# Patient Record
Sex: MALE | Race: WHITE | Employment: UNEMPLOYED | URBAN - METROPOLITAN AREA
[De-identification: names, ages, dates, MRNs, and addresses within clinical notes are randomized per-mention and may not be internally consistent; named-entity substitution may affect disease eponyms.]

---

## 2017-05-24 RX ORDER — PERMETHRIN 50 MG/G
CREAM TOPICAL ONCE
COMMUNITY
End: 2018-11-07 | Stop reason: ALTCHOICE

## 2017-05-24 RX ORDER — GABAPENTIN 100 MG/1
100 CAPSULE ORAL 3 TIMES DAILY
COMMUNITY
End: 2017-10-17 | Stop reason: SDUPTHER

## 2017-05-24 RX ORDER — GLIMEPIRIDE 4 MG/1
4 TABLET ORAL
COMMUNITY
End: 2017-08-04 | Stop reason: SDUPTHER

## 2017-05-24 RX ORDER — TAMSULOSIN HYDROCHLORIDE 0.4 MG/1
0.4 CAPSULE ORAL DAILY
COMMUNITY
End: 2018-03-29 | Stop reason: SDUPTHER

## 2017-05-24 RX ORDER — CLOTRIMAZOLE AND BETAMETHASONE DIPROPIONATE 10; .64 MG/G; MG/G
CREAM TOPICAL 2 TIMES DAILY
COMMUNITY
End: 2018-11-07 | Stop reason: ALTCHOICE

## 2017-05-24 RX ORDER — PIOGLITAZONEHYDROCHLORIDE 30 MG/1
30 TABLET ORAL DAILY
COMMUNITY
End: 2017-12-21 | Stop reason: SDUPTHER

## 2017-05-24 RX ORDER — TRIAMCINOLONE ACETONIDE 1 MG/G
CREAM TOPICAL 2 TIMES DAILY
COMMUNITY
End: 2018-11-07 | Stop reason: ALTCHOICE

## 2017-05-24 RX ORDER — LOVASTATIN 20 MG/1
20 TABLET ORAL NIGHTLY
COMMUNITY
End: 2017-09-26 | Stop reason: SDUPTHER

## 2017-05-24 RX ORDER — GEMFIBROZIL 600 MG/1
600 TABLET, FILM COATED ORAL
COMMUNITY
End: 2018-01-17 | Stop reason: SDUPTHER

## 2017-05-24 RX ORDER — LISINOPRIL AND HYDROCHLOROTHIAZIDE 20; 12.5 MG/1; MG/1
1 TABLET ORAL DAILY
COMMUNITY
End: 2017-09-26 | Stop reason: SDUPTHER

## 2017-05-24 RX ORDER — ACETAMINOPHEN AND CODEINE PHOSPHATE 300; 30 MG/1; MG/1
1 TABLET ORAL EVERY 4 HOURS PRN
COMMUNITY
End: 2018-11-07 | Stop reason: ALTCHOICE

## 2017-05-24 RX ORDER — LANCETS
EACH MISCELLANEOUS 4 TIMES DAILY
COMMUNITY

## 2017-05-24 RX ORDER — FAMOTIDINE 20 MG/1
20 TABLET, FILM COATED ORAL 2 TIMES DAILY
COMMUNITY
End: 2017-08-04 | Stop reason: SDUPTHER

## 2017-08-04 ENCOUNTER — OFFICE VISIT (OUTPATIENT)
Dept: ENDOCRINOLOGY | Age: 72
End: 2017-08-04
Payer: MEDICARE

## 2017-08-04 VITALS
DIASTOLIC BLOOD PRESSURE: 62 MMHG | SYSTOLIC BLOOD PRESSURE: 132 MMHG | BODY MASS INDEX: 42.1 KG/M2 | HEIGHT: 69 IN | HEART RATE: 68 BPM | WEIGHT: 284.25 LBS

## 2017-08-04 DIAGNOSIS — M54.50 CHRONIC MIDLINE LOW BACK PAIN WITHOUT SCIATICA: ICD-10-CM

## 2017-08-04 DIAGNOSIS — E11.9 TYPE 2 DIABETES MELLITUS WITHOUT COMPLICATION, WITH LONG-TERM CURRENT USE OF INSULIN (HCC): Primary | ICD-10-CM

## 2017-08-04 DIAGNOSIS — Z79.4 TYPE 2 DIABETES MELLITUS WITHOUT COMPLICATION, WITH LONG-TERM CURRENT USE OF INSULIN (HCC): Primary | ICD-10-CM

## 2017-08-04 DIAGNOSIS — G89.29 CHRONIC MIDLINE LOW BACK PAIN WITHOUT SCIATICA: ICD-10-CM

## 2017-08-04 DIAGNOSIS — I10 BENIGN ESSENTIAL HYPERTENSION: ICD-10-CM

## 2017-08-04 DIAGNOSIS — E66.9 NON MORBID OBESITY, UNSPECIFIED OBESITY TYPE: ICD-10-CM

## 2017-08-04 DIAGNOSIS — E78.00 PURE HYPERCHOLESTEROLEMIA: ICD-10-CM

## 2017-08-04 PROBLEM — N13.8 BENIGN PROSTATIC HYPERPLASIA WITH URINARY OBSTRUCTION: Status: ACTIVE | Noted: 2017-02-27

## 2017-08-04 PROBLEM — N47.1 PHIMOSIS: Status: ACTIVE | Noted: 2017-02-27

## 2017-08-04 PROBLEM — N40.1 BENIGN PROSTATIC HYPERPLASIA WITH URINARY OBSTRUCTION: Status: ACTIVE | Noted: 2017-02-27

## 2017-08-04 PROCEDURE — 4040F PNEUMOC VAC/ADMIN/RCVD: CPT | Performed by: INTERNAL MEDICINE

## 2017-08-04 PROCEDURE — 3046F HEMOGLOBIN A1C LEVEL >9.0%: CPT | Performed by: INTERNAL MEDICINE

## 2017-08-04 PROCEDURE — 1123F ACP DISCUSS/DSCN MKR DOCD: CPT | Performed by: INTERNAL MEDICINE

## 2017-08-04 PROCEDURE — G8417 CALC BMI ABV UP PARAM F/U: HCPCS | Performed by: INTERNAL MEDICINE

## 2017-08-04 PROCEDURE — 3017F COLORECTAL CA SCREEN DOC REV: CPT | Performed by: INTERNAL MEDICINE

## 2017-08-04 PROCEDURE — G8427 DOCREV CUR MEDS BY ELIG CLIN: HCPCS | Performed by: INTERNAL MEDICINE

## 2017-08-04 PROCEDURE — 99213 OFFICE O/P EST LOW 20 MIN: CPT | Performed by: INTERNAL MEDICINE

## 2017-08-04 PROCEDURE — 1036F TOBACCO NON-USER: CPT | Performed by: INTERNAL MEDICINE

## 2017-08-04 RX ORDER — GLIMEPIRIDE 4 MG/1
4 TABLET ORAL 2 TIMES DAILY
Qty: 180 TABLET | Refills: 1 | Status: SHIPPED | OUTPATIENT
Start: 2017-08-04 | End: 2017-12-21 | Stop reason: SDUPTHER

## 2017-08-04 RX ORDER — FAMOTIDINE 20 MG/1
20 TABLET, FILM COATED ORAL 2 TIMES DAILY PRN
Qty: 100 TABLET | Refills: 1 | Status: SHIPPED | OUTPATIENT
Start: 2017-08-04 | End: 2018-11-07 | Stop reason: ALTCHOICE

## 2017-09-27 RX ORDER — LISINOPRIL AND HYDROCHLOROTHIAZIDE 20; 12.5 MG/1; MG/1
TABLET ORAL
Qty: 90 TABLET | Refills: 3 | Status: SHIPPED | OUTPATIENT
Start: 2017-09-27 | End: 2018-09-25 | Stop reason: SDUPTHER

## 2017-09-27 RX ORDER — LOVASTATIN 20 MG/1
TABLET ORAL
Qty: 90 TABLET | Refills: 3 | Status: SHIPPED | OUTPATIENT
Start: 2017-09-27 | End: 2018-09-25 | Stop reason: SDUPTHER

## 2017-10-17 RX ORDER — GABAPENTIN 100 MG/1
100 CAPSULE ORAL 3 TIMES DAILY
Qty: 270 CAPSULE | Refills: 3 | Status: SHIPPED | OUTPATIENT
Start: 2017-10-17 | End: 2018-09-25 | Stop reason: SDUPTHER

## 2017-10-17 RX ORDER — GABAPENTIN 100 MG/1
100 CAPSULE ORAL 3 TIMES DAILY
Qty: 90 CAPSULE | Refills: 8 | Status: SHIPPED | OUTPATIENT
Start: 2017-10-17 | End: 2017-10-17 | Stop reason: SDUPTHER

## 2017-12-13 ENCOUNTER — TELEPHONE (OUTPATIENT)
Dept: INTERNAL MEDICINE CLINIC | Age: 72
End: 2017-12-13

## 2017-12-22 RX ORDER — PIOGLITAZONEHYDROCHLORIDE 30 MG/1
TABLET ORAL
Qty: 90 TABLET | Refills: 0 | Status: SHIPPED | OUTPATIENT
Start: 2017-12-22 | End: 2018-03-29 | Stop reason: SDUPTHER

## 2017-12-22 RX ORDER — GLIMEPIRIDE 4 MG/1
TABLET ORAL
Qty: 180 TABLET | Refills: 0 | Status: SHIPPED | OUTPATIENT
Start: 2017-12-22 | End: 2018-09-25 | Stop reason: SDUPTHER

## 2017-12-22 RX ORDER — CITALOPRAM 10 MG/1
TABLET ORAL
Qty: 90 TABLET | Refills: 0 | Status: SHIPPED | OUTPATIENT
Start: 2017-12-22 | End: 2018-03-29 | Stop reason: SDUPTHER

## 2018-01-08 RX ORDER — GEMFIBROZIL 600 MG/1
TABLET, FILM COATED ORAL
Qty: 180 TABLET | Refills: 3 | OUTPATIENT
Start: 2018-01-08

## 2018-01-17 ENCOUNTER — TELEPHONE (OUTPATIENT)
Dept: INTERNAL MEDICINE CLINIC | Age: 73
End: 2018-01-17

## 2018-01-17 RX ORDER — GEMFIBROZIL 600 MG/1
600 TABLET, FILM COATED ORAL
Qty: 60 TABLET | Refills: 8 | Status: SHIPPED | OUTPATIENT
Start: 2018-01-17 | End: 2018-01-30 | Stop reason: SDUPTHER

## 2018-02-01 RX ORDER — GEMFIBROZIL 600 MG/1
600 TABLET, FILM COATED ORAL
Qty: 180 TABLET | Refills: 1 | Status: SHIPPED | OUTPATIENT
Start: 2018-02-01 | End: 2018-06-11 | Stop reason: SDUPTHER

## 2018-03-22 DIAGNOSIS — Z79.4 TYPE 2 DIABETES MELLITUS WITHOUT COMPLICATION, WITH LONG-TERM CURRENT USE OF INSULIN (HCC): ICD-10-CM

## 2018-03-22 DIAGNOSIS — E11.9 TYPE 2 DIABETES MELLITUS WITHOUT COMPLICATION, WITH LONG-TERM CURRENT USE OF INSULIN (HCC): ICD-10-CM

## 2018-03-23 ENCOUNTER — OFFICE VISIT (OUTPATIENT)
Dept: ENDOCRINOLOGY | Age: 73
End: 2018-03-23
Payer: MEDICARE

## 2018-03-23 VITALS
TEMPERATURE: 98.1 F | SYSTOLIC BLOOD PRESSURE: 130 MMHG | HEART RATE: 70 BPM | HEIGHT: 69 IN | RESPIRATION RATE: 18 BRPM | BODY MASS INDEX: 42.83 KG/M2 | DIASTOLIC BLOOD PRESSURE: 52 MMHG | OXYGEN SATURATION: 95 % | WEIGHT: 289.2 LBS

## 2018-03-23 DIAGNOSIS — E66.9 NON MORBID OBESITY, UNSPECIFIED OBESITY TYPE: ICD-10-CM

## 2018-03-23 DIAGNOSIS — M54.50 CHRONIC MIDLINE LOW BACK PAIN WITHOUT SCIATICA: ICD-10-CM

## 2018-03-23 DIAGNOSIS — Z79.4 TYPE 2 DIABETES MELLITUS WITHOUT COMPLICATION, WITH LONG-TERM CURRENT USE OF INSULIN (HCC): Primary | ICD-10-CM

## 2018-03-23 DIAGNOSIS — I10 BENIGN ESSENTIAL HYPERTENSION: ICD-10-CM

## 2018-03-23 DIAGNOSIS — E78.00 PURE HYPERCHOLESTEROLEMIA: ICD-10-CM

## 2018-03-23 DIAGNOSIS — E11.9 TYPE 2 DIABETES MELLITUS WITHOUT COMPLICATION, WITH LONG-TERM CURRENT USE OF INSULIN (HCC): Primary | ICD-10-CM

## 2018-03-23 DIAGNOSIS — G89.29 CHRONIC MIDLINE LOW BACK PAIN WITHOUT SCIATICA: ICD-10-CM

## 2018-03-23 PROCEDURE — 3046F HEMOGLOBIN A1C LEVEL >9.0%: CPT | Performed by: INTERNAL MEDICINE

## 2018-03-23 PROCEDURE — 4040F PNEUMOC VAC/ADMIN/RCVD: CPT | Performed by: INTERNAL MEDICINE

## 2018-03-23 PROCEDURE — G8427 DOCREV CUR MEDS BY ELIG CLIN: HCPCS | Performed by: INTERNAL MEDICINE

## 2018-03-23 PROCEDURE — 3017F COLORECTAL CA SCREEN DOC REV: CPT | Performed by: INTERNAL MEDICINE

## 2018-03-23 PROCEDURE — G8484 FLU IMMUNIZE NO ADMIN: HCPCS | Performed by: INTERNAL MEDICINE

## 2018-03-23 PROCEDURE — G8417 CALC BMI ABV UP PARAM F/U: HCPCS | Performed by: INTERNAL MEDICINE

## 2018-03-23 PROCEDURE — 1036F TOBACCO NON-USER: CPT | Performed by: INTERNAL MEDICINE

## 2018-03-23 PROCEDURE — 1123F ACP DISCUSS/DSCN MKR DOCD: CPT | Performed by: INTERNAL MEDICINE

## 2018-03-23 PROCEDURE — 99213 OFFICE O/P EST LOW 20 MIN: CPT | Performed by: INTERNAL MEDICINE

## 2018-03-23 NOTE — PROGRESS NOTES
Dr. Zan Quiroz [facility]  [id number] [facility]. [place] [address] [address]    Brain Jane is a 74 y.o. male who presents today follow up   Chief Complaint   Patient presents with    Diabetes     7 month follow-up    Discuss Medications     Need new rx for testing strips to have quantity enough to test bid    Discuss Labs     Completed 2-20-18   . BP (!) 130/52 (Site: Right Arm, Position: Sitting, Cuff Size: Medium Adult)   Pulse 70 Comment: REGULAR  Temp 98.1 °F (36.7 °C)   Resp 18   Ht 5' 9.02\" (1.753 m)   Wt 289 lb 3.2 oz (131.2 kg)   SpO2 95%   BMI 42.69 kg/m²     No results found for: LABA1C  No results found for: EAG  Wt Readings from Last 3 Encounters:   03/23/18 289 lb 3.2 oz (131.2 kg)   08/04/17 284 lb 4 oz (128.9 kg)       Body mass index is 42.69 kg/m². BP Readings from Last 3 Encounters:   03/23/18 (!) 130/52   08/04/17 132/62       Diabetes Management   Topic Date Due    Diabetic foot exam  04/30/1953    Diabetic retinal exam  04/30/1953    Lipid screen  04/30/1953    Diabetic microalbuminuria test  04/30/1961       HPI:       HPI AND REVIEW OF SYSTEMS     PATIENT COMES IN FOR FOLLOW UP OF      DIABETES TYPE 2   NEUROPATHY . HYPERTENSION   PAST HX OF DEPRESSION . NO MEDS NOW. HX OF LOW BACK PAIN CHRONIC    HYPERCHOLESTEROLEMIA   OBESITY   ENLARGED PROSTATE . 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 TAKING GLIMEPIRIDE AND METFORMIN  PREVIOUS VISIT  8/4/17  REVIEWED. SUGAR  REVIEWED . GOOD. NO LOW SUGAR REACTIONS  NO POLY URIA, POLY DYPSIA OR DYSURIA  LEGALLY BLIND  HAS PARESTHESIAS IN FEET NOT BAD  NO DIZZINESS  NO CLAUDICATION  NO CHEST PAIN. GETS LITTLE SHORT OF BREATH WHEN WORKING  , NOT NEW SYMPTOM AND THERE IS NO CHANGE. BOWELS OK  ACID REFLUX BETTER. TAKES PRN PEPCID  HAS  LOW BACK PAIN    NOT HAD XRAY.  DOES NOT WANT.   PAST HX, FAMILY HX AND SOCIAL HX REVIEWED.         Past Medical History:   Diagnosis Date    Benign essential hypertension     Benign prostatic hyperplasia     Bleeding nose     Past Hx    Chronic midline low back pain without sciatica 8/4/2017    Depressive disorder     was taking Celexa- NOT TAKING NOW.  Diabetic polyneuropathy (HCC)     Dyspepsia     [place]    Herpes zoster     Hx Left Arm    Impotence of organic origin     Legally blind     Optic Nerve Stroke    Obesity     Pure hypercholesterolemia     Type 2 diabetes mellitus without complication ([facility] [address]     Uncontrolled        History reviewed. No pertinent surgical history. Family History   Problem Relation Age of Onset    Diabetes Father      Social History   Substance Use Topics    Smoking status: Never Smoker    Smokeless tobacco: Never Used    Alcohol use No        Current Outpatient Prescriptions   Medication Sig Dispense Refill    gemfibrozil (LOPID) 600 MG tablet Take 1 tablet by mouth 2 times daily (before meals) 180 tablet 1    metFORMIN (GLUCOPHAGE) 1000 MG tablet TAKE 1 TABLET TWICE DAILY 180 tablet 0    citalopram (CELEXA) 10 MG tablet TAKE 1 TABLET EVERY DAY 90 tablet 0    pioglitazone (ACTOS) 30 MG tablet TAKE 1 TABLET EVERY DAY 90 tablet 0    glimepiride (AMARYL) 4 MG tablet TAKE 1 TABLET TWICE DAILY 180 tablet 0    gabapentin (NEURONTIN) 100 MG capsule Take 1 capsule by mouth 3 times daily PLEASE GIVE 3 MONTHS SUPPLY 270 capsule 3    lovastatin (MEVACOR) 20 MG tablet TAKE 1 TABLET EVERY DAY 90 tablet 3    lisinopril-hydrochlorothiazide (PRINZIDE;ZESTORETIC) 20-12.5 MG per tablet TAKE 1 TABLET EVERY DAY 90 tablet 3    Accu-Chek Softclix Lancets MISC by Does not apply route 4 times daily      acetaminophen-codeine (TYLENOL #3) 300-30 MG per tablet Take 1 tablet by mouth every 4 hours as needed for Pain      NONFORMULARY Blood sugar diagnostic, drum- type strips  Take 3 strips everyday by miscell. Route for 90 days.   Note: NOW.  HX OF LOW BACK PAIN CHRONIC . STABLE  HYPERCHOLESTEROLEMIA   OBESITY   ENLARGED PROSTATE . 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 Type 2 diabetes mellitus without complication, with long-term current use of insulin ([address]    2. Benign essential hypertension    3. Pure hypercholesterolemia    4. Non morbid obesity, unspecified obesity type    5. Chronic midline low back pain without sciatica          Plan:      1. A1C  DISCUSSED. 2.  CONTINUE ALL MEDS AS REVIEWED AND DISCUSSED. 4. CANCEL  LUMBAR SPINE XRAY  5. TYLENOL BID PRN FOR BACK PAIN. 7. RETURN  5  MONTHS. 8. DIET AND EXERCISE AS TOLERATED. No orders of the defined types were placed in this encounter. No orders of the defined types were placed in this encounter. Return in about 5 months (around 8/23/2018) for DIABETES, Follow Up for HTN, HYPERCHOLESTEROLEMIA. Visit date not found        Patient given educational materials - see patient instructions. Discussed use, benefit, and side effects of prescribed medications. All patient questions answered. Pt voiced understanding. Reviewed health maintenance. Instructed to continue current medications, diet and exercise. Patient agreed with treatment plan. Follow up as directed.        Electronically signed by Alexus Lee MD on 3/23/2018

## 2018-03-29 RX ORDER — TAMSULOSIN HYDROCHLORIDE 0.4 MG/1
CAPSULE ORAL
Qty: 180 CAPSULE | Refills: 1 | Status: SHIPPED | OUTPATIENT
Start: 2018-03-29 | End: 2018-09-25 | Stop reason: SDUPTHER

## 2018-03-29 RX ORDER — PIOGLITAZONEHYDROCHLORIDE 30 MG/1
TABLET ORAL
Qty: 90 TABLET | Refills: 1 | Status: SHIPPED | OUTPATIENT
Start: 2018-03-29 | End: 2018-09-25 | Stop reason: SDUPTHER

## 2018-03-29 RX ORDER — CITALOPRAM 10 MG/1
TABLET ORAL
Qty: 90 TABLET | Refills: 1 | Status: SHIPPED | OUTPATIENT
Start: 2018-03-29 | End: 2018-09-25 | Stop reason: SDUPTHER

## 2018-06-12 RX ORDER — GEMFIBROZIL 600 MG/1
TABLET, FILM COATED ORAL
Qty: 180 TABLET | Refills: 0 | Status: SHIPPED | OUTPATIENT
Start: 2018-06-12 | End: 2018-09-25 | Stop reason: SDUPTHER

## 2018-09-26 RX ORDER — LISINOPRIL AND HYDROCHLOROTHIAZIDE 20; 12.5 MG/1; MG/1
TABLET ORAL
Qty: 90 TABLET | Refills: 1 | Status: SHIPPED | OUTPATIENT
Start: 2018-09-26 | End: 2019-03-08 | Stop reason: SDUPTHER

## 2018-09-26 RX ORDER — PIOGLITAZONEHYDROCHLORIDE 30 MG/1
TABLET ORAL
Qty: 90 TABLET | Refills: 1 | Status: SHIPPED | OUTPATIENT
Start: 2018-09-26 | End: 2019-03-08 | Stop reason: SDUPTHER

## 2018-09-26 RX ORDER — GEMFIBROZIL 600 MG/1
TABLET, FILM COATED ORAL
Qty: 180 TABLET | Refills: 1 | Status: SHIPPED | OUTPATIENT
Start: 2018-09-26 | End: 2019-03-08 | Stop reason: SDUPTHER

## 2018-09-26 RX ORDER — LOVASTATIN 20 MG/1
TABLET ORAL
Qty: 90 TABLET | Refills: 1 | Status: SHIPPED | OUTPATIENT
Start: 2018-09-26 | End: 2019-03-08 | Stop reason: SDUPTHER

## 2018-09-26 RX ORDER — GLIMEPIRIDE 4 MG/1
TABLET ORAL
Qty: 180 TABLET | Refills: 1 | Status: SHIPPED | OUTPATIENT
Start: 2018-09-26 | End: 2018-11-07 | Stop reason: SDUPTHER

## 2018-09-26 RX ORDER — GABAPENTIN 100 MG/1
CAPSULE ORAL
Qty: 270 CAPSULE | Refills: 1 | Status: SHIPPED | OUTPATIENT
Start: 2018-09-26 | End: 2018-11-07 | Stop reason: ALTCHOICE

## 2018-09-26 RX ORDER — CITALOPRAM 10 MG/1
TABLET ORAL
Qty: 90 TABLET | Refills: 1 | Status: SHIPPED | OUTPATIENT
Start: 2018-09-26 | End: 2018-11-07

## 2018-09-26 RX ORDER — TAMSULOSIN HYDROCHLORIDE 0.4 MG/1
CAPSULE ORAL
Qty: 180 CAPSULE | Refills: 1 | Status: SHIPPED | OUTPATIENT
Start: 2018-09-26 | End: 2018-11-07

## 2018-11-07 ENCOUNTER — OFFICE VISIT (OUTPATIENT)
Dept: ENDOCRINOLOGY | Age: 73
End: 2018-11-07
Payer: MEDICARE

## 2018-11-07 VITALS
SYSTOLIC BLOOD PRESSURE: 122 MMHG | TEMPERATURE: 98.6 F | BODY MASS INDEX: 42.36 KG/M2 | HEART RATE: 70 BPM | DIASTOLIC BLOOD PRESSURE: 60 MMHG | HEIGHT: 69 IN | OXYGEN SATURATION: 97 % | WEIGHT: 286 LBS

## 2018-11-07 DIAGNOSIS — E66.9 NON MORBID OBESITY, UNSPECIFIED OBESITY TYPE: ICD-10-CM

## 2018-11-07 DIAGNOSIS — I10 BENIGN ESSENTIAL HYPERTENSION: ICD-10-CM

## 2018-11-07 DIAGNOSIS — M54.50 CHRONIC MIDLINE LOW BACK PAIN WITHOUT SCIATICA: ICD-10-CM

## 2018-11-07 DIAGNOSIS — G89.29 CHRONIC MIDLINE LOW BACK PAIN WITHOUT SCIATICA: ICD-10-CM

## 2018-11-07 DIAGNOSIS — E11.9 TYPE 2 DIABETES MELLITUS WITHOUT COMPLICATION, WITH LONG-TERM CURRENT USE OF INSULIN (HCC): Primary | ICD-10-CM

## 2018-11-07 DIAGNOSIS — Z79.4 TYPE 2 DIABETES MELLITUS WITHOUT COMPLICATION, WITH LONG-TERM CURRENT USE OF INSULIN (HCC): Primary | ICD-10-CM

## 2018-11-07 DIAGNOSIS — E78.00 PURE HYPERCHOLESTEROLEMIA: ICD-10-CM

## 2018-11-07 LAB — HBA1C MFR BLD: 5.9 %

## 2018-11-07 PROCEDURE — 4040F PNEUMOC VAC/ADMIN/RCVD: CPT | Performed by: INTERNAL MEDICINE

## 2018-11-07 PROCEDURE — G8484 FLU IMMUNIZE NO ADMIN: HCPCS | Performed by: INTERNAL MEDICINE

## 2018-11-07 PROCEDURE — 1036F TOBACCO NON-USER: CPT | Performed by: INTERNAL MEDICINE

## 2018-11-07 PROCEDURE — 2022F DILAT RTA XM EVC RTNOPTHY: CPT | Performed by: INTERNAL MEDICINE

## 2018-11-07 PROCEDURE — 1101F PT FALLS ASSESS-DOCD LE1/YR: CPT | Performed by: INTERNAL MEDICINE

## 2018-11-07 PROCEDURE — G8427 DOCREV CUR MEDS BY ELIG CLIN: HCPCS | Performed by: INTERNAL MEDICINE

## 2018-11-07 PROCEDURE — 1123F ACP DISCUSS/DSCN MKR DOCD: CPT | Performed by: INTERNAL MEDICINE

## 2018-11-07 PROCEDURE — 3044F HG A1C LEVEL LT 7.0%: CPT | Performed by: INTERNAL MEDICINE

## 2018-11-07 PROCEDURE — 3017F COLORECTAL CA SCREEN DOC REV: CPT | Performed by: INTERNAL MEDICINE

## 2018-11-07 PROCEDURE — 83036 HEMOGLOBIN GLYCOSYLATED A1C: CPT | Performed by: INTERNAL MEDICINE

## 2018-11-07 PROCEDURE — G8417 CALC BMI ABV UP PARAM F/U: HCPCS | Performed by: INTERNAL MEDICINE

## 2018-11-07 PROCEDURE — 99214 OFFICE O/P EST MOD 30 MIN: CPT | Performed by: INTERNAL MEDICINE

## 2018-11-07 RX ORDER — GABAPENTIN 100 MG/1
100 CAPSULE ORAL 3 TIMES DAILY
Qty: 90 CAPSULE | Refills: 5
Start: 2018-11-07 | End: 2019-03-08 | Stop reason: SDUPTHER

## 2018-11-07 RX ORDER — GLIMEPIRIDE 4 MG/1
2 TABLET ORAL
Qty: 90 TABLET | Refills: 0
Start: 2018-11-07 | End: 2018-11-07

## 2018-11-07 NOTE — PROGRESS NOTES
Chronic midline low back pain without sciatica 8/4/2017    Depressive disorder     was taking Celexa- NOT TAKING NOW.  Diabetic polyneuropathy (HCC)     Dyspepsia     [place]    Herpes zoster     Hx Left Arm    Impotence of organic origin     Legally blind     Optic Nerve Stroke    Obesity     Pure hypercholesterolemia     Type 2 diabetes mellitus without complication ([facility] [address]     Uncontrolled        History reviewed. No pertinent surgical history. Family History   Problem Relation Age of Onset    Diabetes Father      Social History   Substance Use Topics    Smoking status: Never Smoker    Smokeless tobacco: Never Used    Alcohol use No        Current Outpatient Prescriptions   Medication Sig Dispense Refill    aspirin 81 MG tablet Take 81 mg by mouth daily      gabapentin (NEURONTIN) 100 MG capsule Take 1 capsule by mouth 3 times daily for 180 days. Intended supply: 30 days. 90 capsule 5    gemfibrozil (LOPID) 600 MG tablet TAKE 1 TABLET 2 TIMES DAILY (BEFORE MEALS) 180 tablet 1    lisinopril-hydrochlorothiazide (PRINZIDE;ZESTORETIC) 20-12.5 MG per tablet TAKE 1 TABLET EVERY DAY 90 tablet 1    lovastatin (MEVACOR) 20 MG tablet TAKE 1 TABLET EVERY DAY 90 tablet 1    metFORMIN (GLUCOPHAGE) 1000 MG tablet TAKE 1 TABLET TWICE DAILY 180 tablet 1    pioglitazone (ACTOS) 30 MG tablet TAKE 1 TABLET EVERY DAY 90 tablet 1    glucose blood VI test strips (ASCENSIA AUTODISC VI;ONE TOUCH ULTRA TEST VI) strip 1 each by Does not apply route 2 times daily As needed. 300 each 1    Accu-Chek Softclix Lancets MISC by Does not apply route 4 times daily      NONFORMULARY Blood sugar diagnostic, drum- type strips  Take 3 strips everyday by miscell. Route for 90 days. Note: Check Sugars Once or Twice a Day       No current facility-administered medications for this visit.         Allergies   Allergen Reactions    Bactrim [Sulfamethoxazole-Trimethoprim]     Diclofenac Nausea Only    Zoloft

## 2018-11-20 ENCOUNTER — TELEPHONE (OUTPATIENT)
Dept: ENDOCRINOLOGY | Age: 73
End: 2018-11-20

## 2018-11-20 RX ORDER — TAMSULOSIN HYDROCHLORIDE 0.4 MG/1
0.4 CAPSULE ORAL 2 TIMES DAILY
Qty: 60 CAPSULE | Refills: 0 | COMMUNITY
Start: 2018-11-20 | End: 2019-03-08 | Stop reason: SDUPTHER

## 2018-11-20 RX ORDER — FAMOTIDINE 20 MG/1
20 TABLET, FILM COATED ORAL 2 TIMES DAILY
Qty: 60 TABLET | Refills: 0 | COMMUNITY
Start: 2018-11-20

## 2018-11-20 RX ORDER — CITALOPRAM 10 MG/1
10 TABLET ORAL DAILY
Qty: 30 TABLET | Refills: 0 | COMMUNITY
Start: 2018-11-20 | End: 2019-03-08 | Stop reason: SDUPTHER

## 2019-02-27 ENCOUNTER — TELEPHONE (OUTPATIENT)
Dept: ENDOCRINOLOGY | Age: 74
End: 2019-02-27

## 2019-03-06 ENCOUNTER — TELEPHONE (OUTPATIENT)
Dept: ENDOCRINOLOGY | Age: 74
End: 2019-03-06

## 2019-03-07 RX ORDER — NATEGLINIDE 120 MG/1
120 TABLET ORAL
Qty: 270 TABLET | Refills: 1 | Status: SHIPPED | OUTPATIENT
Start: 2019-03-07 | End: 2019-08-05 | Stop reason: SDUPTHER

## 2019-03-07 RX ORDER — NATEGLINIDE 120 MG/1
120 TABLET ORAL
Qty: 90 TABLET | Refills: 1 | Status: SHIPPED | OUTPATIENT
Start: 2019-03-07 | End: 2019-03-07 | Stop reason: SDUPTHER

## 2019-03-08 RX ORDER — GEMFIBROZIL 600 MG/1
TABLET, FILM COATED ORAL
Qty: 180 TABLET | Refills: 1 | Status: SHIPPED | OUTPATIENT
Start: 2019-03-08 | End: 2019-07-29 | Stop reason: SDUPTHER

## 2019-03-08 RX ORDER — GABAPENTIN 100 MG/1
CAPSULE ORAL
Qty: 270 CAPSULE | Refills: 1 | Status: SHIPPED | OUTPATIENT
Start: 2019-03-08 | End: 2019-12-12 | Stop reason: SDUPTHER

## 2019-03-08 RX ORDER — CITALOPRAM 10 MG/1
TABLET ORAL
Qty: 90 TABLET | Refills: 1 | Status: SHIPPED | OUTPATIENT
Start: 2019-03-08 | End: 2019-07-29 | Stop reason: SDUPTHER

## 2019-03-08 RX ORDER — LISINOPRIL AND HYDROCHLOROTHIAZIDE 20; 12.5 MG/1; MG/1
TABLET ORAL
Qty: 90 TABLET | Refills: 1 | Status: SHIPPED | OUTPATIENT
Start: 2019-03-08 | End: 2019-07-29 | Stop reason: SDUPTHER

## 2019-03-08 RX ORDER — LOVASTATIN 20 MG/1
TABLET ORAL
Qty: 90 TABLET | Refills: 1 | Status: SHIPPED | OUTPATIENT
Start: 2019-03-08 | End: 2019-07-29 | Stop reason: SDUPTHER

## 2019-03-08 RX ORDER — TAMSULOSIN HYDROCHLORIDE 0.4 MG/1
CAPSULE ORAL
Qty: 180 CAPSULE | Refills: 1 | Status: SHIPPED | OUTPATIENT
Start: 2019-03-08 | End: 2019-07-29 | Stop reason: SDUPTHER

## 2019-03-08 RX ORDER — PIOGLITAZONEHYDROCHLORIDE 30 MG/1
TABLET ORAL
Qty: 90 TABLET | Refills: 1 | Status: SHIPPED | OUTPATIENT
Start: 2019-03-08 | End: 2019-07-29 | Stop reason: SDUPTHER

## 2019-04-24 ENCOUNTER — OFFICE VISIT (OUTPATIENT)
Dept: ENDOCRINOLOGY | Age: 74
End: 2019-04-24
Payer: MEDICARE

## 2019-04-24 VITALS
OXYGEN SATURATION: 91 % | DIASTOLIC BLOOD PRESSURE: 60 MMHG | SYSTOLIC BLOOD PRESSURE: 124 MMHG | TEMPERATURE: 98.1 F | HEART RATE: 72 BPM | WEIGHT: 285 LBS | HEIGHT: 69 IN | BODY MASS INDEX: 42.21 KG/M2

## 2019-04-24 DIAGNOSIS — E66.9 NON MORBID OBESITY, UNSPECIFIED OBESITY TYPE: ICD-10-CM

## 2019-04-24 DIAGNOSIS — I10 BENIGN ESSENTIAL HYPERTENSION: ICD-10-CM

## 2019-04-24 DIAGNOSIS — E66.01 MORBID OBESITY WITH BMI OF 40.0-44.9, ADULT (HCC): ICD-10-CM

## 2019-04-24 DIAGNOSIS — E78.00 PURE HYPERCHOLESTEROLEMIA: ICD-10-CM

## 2019-04-24 DIAGNOSIS — E11.9 TYPE 2 DIABETES MELLITUS WITHOUT COMPLICATION, WITH LONG-TERM CURRENT USE OF INSULIN (HCC): Primary | ICD-10-CM

## 2019-04-24 DIAGNOSIS — G89.29 CHRONIC MIDLINE LOW BACK PAIN WITHOUT SCIATICA: ICD-10-CM

## 2019-04-24 DIAGNOSIS — M54.50 CHRONIC MIDLINE LOW BACK PAIN WITHOUT SCIATICA: ICD-10-CM

## 2019-04-24 DIAGNOSIS — Z79.4 TYPE 2 DIABETES MELLITUS WITHOUT COMPLICATION, WITH LONG-TERM CURRENT USE OF INSULIN (HCC): Primary | ICD-10-CM

## 2019-04-24 LAB — HBA1C MFR BLD: 7.7 %

## 2019-04-24 PROCEDURE — 2022F DILAT RTA XM EVC RTNOPTHY: CPT | Performed by: INTERNAL MEDICINE

## 2019-04-24 PROCEDURE — 3017F COLORECTAL CA SCREEN DOC REV: CPT | Performed by: INTERNAL MEDICINE

## 2019-04-24 PROCEDURE — 83036 HEMOGLOBIN GLYCOSYLATED A1C: CPT | Performed by: INTERNAL MEDICINE

## 2019-04-24 PROCEDURE — G8417 CALC BMI ABV UP PARAM F/U: HCPCS | Performed by: INTERNAL MEDICINE

## 2019-04-24 PROCEDURE — 1123F ACP DISCUSS/DSCN MKR DOCD: CPT | Performed by: INTERNAL MEDICINE

## 2019-04-24 PROCEDURE — 4040F PNEUMOC VAC/ADMIN/RCVD: CPT | Performed by: INTERNAL MEDICINE

## 2019-04-24 PROCEDURE — 1036F TOBACCO NON-USER: CPT | Performed by: INTERNAL MEDICINE

## 2019-04-24 PROCEDURE — 3045F PR MOST RECENT HEMOGLOBIN A1C LEVEL 7.0-9.0%: CPT | Performed by: INTERNAL MEDICINE

## 2019-04-24 PROCEDURE — G8427 DOCREV CUR MEDS BY ELIG CLIN: HCPCS | Performed by: INTERNAL MEDICINE

## 2019-04-24 PROCEDURE — 99214 OFFICE O/P EST MOD 30 MIN: CPT | Performed by: INTERNAL MEDICINE

## 2019-04-24 RX ORDER — FLASH GLUCOSE SCANNING READER
EACH MISCELLANEOUS
Qty: 1 DEVICE | Refills: 0 | Status: SHIPPED | OUTPATIENT
Start: 2019-04-24

## 2019-04-24 RX ORDER — FLASH GLUCOSE SENSOR
KIT MISCELLANEOUS
Qty: 2 EACH | Refills: 5 | Status: SHIPPED | OUTPATIENT
Start: 2019-04-24

## 2019-04-24 NOTE — PROGRESS NOTES
Dr. Allyn Scott [facility]  [id number] [facility]. Justin Maki is a 74 y.o. male who presents today follow up   Chief Complaint   Patient presents with    Diabetes     5 month f/u    . /60 (Site: Right Lower Arm, Position: Sitting, Cuff Size: Medium Adult)   Pulse 72 Comment: REGULAR  Temp 98.1 °F (36.7 °C)   Ht 5' 9.02\" (1.753 m)   Wt 285 lb (129.3 kg)   SpO2 91%   BMI 42.07 kg/m²     Lab Results   Component Value Date    LABA1C 7.7 04/24/2019     No results found for: EAG  Wt Readings from Last 3 Encounters:   04/24/19 285 lb (129.3 kg)   11/07/18 286 lb (129.7 kg)   03/23/18 289 lb 3.2 oz (131.2 kg)       Body mass index is 42.07 kg/m². BP Readings from Last 3 Encounters:   04/24/19 124/60   11/07/18 122/60   03/23/18 (!) 130/52       Diabetes Management   Topic Date Due    Diabetic foot exam  04/30/1953    Lipid screen  04/30/1953       HPI:       HPI AND REVIEW OF SYSTEMS     PATIENT COMES IN FOR FOLLOW UP OF      DIABETES TYPE 2   NEUROPATHY . HYPERTENSION   PAST HX OF DEPRESSION . NO MEDS NOW. HX OF LOW BACK PAIN CHRONIC    HYPERCHOLESTEROLEMIA   OBESITY   ENLARGED PROSTATE . 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 TAKING  NATEGLINIDE 120 MG TID, AND METFORMIN 1000 MG BID AND ACTOS 30 MG DAILY  PREVIOUS VISIT  11/7/18  REVIEWED. SUGAR  REVIEWED . GOOD. RUNNING  AROUND 150 RANGE. NO LOW SUGAR REACTIONS  NO POLY URIA, POLY DYPSIA OR DYSURIA  LEGALLY BLIND  HAS PARESTHESIAS IN FEET NOT BAD  NO CLAUDICATION  NO CHEST PAIN. GETS LITTLE SHORT OF BREATH WHEN WORKING  , NOT NEW SYMPTOM AND THERE IS NO CHANGE. BOWELS OK  ACID REFLUX BETTER. HAS  LOW BACK PAIN NOT BAD.     HAD  AN EPISODE OF DIZZINESS/ VERTIGO 3 WEEKS AGO . WENT TO [facility]. WAS GIVEN MEDICATION. ?  ANTIVERT NO DIZZINESS NOW.         Past Medical History:   Diagnosis Date    Benign essential hypertension     Benign prostatic hyperplasia     Bleeding nose     Past Hx    Chronic midline low back pain without sciatica 8/4/2017    Depressive disorder     was taking Celexa- NOT TAKING NOW.  Diabetic polyneuropathy (HCC)     Dyspepsia     [place]     [place]    Herpes zoster     Hx Left Arm    Impotence of organic origin     Legally blind     Optic Nerve Stroke    Obesity     Pure hypercholesterolemia     Type 2 diabetes mellitus without complication ([facility] [address]     Uncontrolled        History reviewed. No pertinent surgical history.   Family History   Problem Relation Age of Onset    Diabetes Father      Social History     Tobacco Use    Smoking status: Never Smoker    Smokeless tobacco: Never Used   Substance Use Topics    Alcohol use: No        Current Outpatient Medications   Medication Sig Dispense Refill    Polyethyl Glycol-Propyl Glycol (SYSTANE ULTRA OP) Apply to eye 4 times daily      Continuous Blood Gluc  (FREESTYLE LOUIS 14 DAY READER) JOVANNY USE AS DIRECTED1 1 Device 0    Continuous Blood Gluc Sensor (FREESTYLE LOUIS SENSOR SYSTEM) MISC USE AS DIRECTED 2 each 5    tamsulosin (FLOMAX) 0.4 MG capsule TAKE 1 CAPSULE TWICE DAILY 180 capsule 1    pioglitazone (ACTOS) 30 MG tablet TAKE 1 TABLET EVERY DAY 90 tablet 1    citalopram (CELEXA) 10 MG tablet TAKE 1 TABLET EVERY DAY 90 tablet 1    metFORMIN (GLUCOPHAGE) 1000 MG tablet TAKE 1 TABLET TWICE DAILY 180 tablet 1    lovastatin (MEVACOR) 20 MG tablet TAKE 1 TABLET EVERY DAY 90 tablet 1    gabapentin (NEURONTIN) 100 MG capsule TAKE 1 CAPSULE THREE TIMES DAILY 270 capsule 1    lisinopril-hydrochlorothiazide (PRINZIDE;ZESTORETIC) 20-12.5 MG per tablet TAKE 1 TABLET EVERY DAY 90 tablet 1    gemfibrozil (LOPID) 600 MG tablet TAKE 1 TABLET TWICE DAILY BEFORE MEALS 180 tablet 1    nateglinide (STARLIX) 120 MG tablet Take 1 tablet by mouth 3 times daily (before meals) 270 tablet 1    famotidine (PEPCID) 20 MG tablet Take 1 tablet by mouth 2 times daily 60 tablet 0    aspirin 81 MG tablet Take 81 mg by mouth daily      glucose blood VI test strips (ASCENSIA AUTODISC VI;ONE TOUCH ULTRA TEST VI) strip 1 each by Does not apply route 2 times daily As needed. 300 each 1    Accu-Chek Softclix Lancets MISC by Does not apply route 4 times daily      NONFORMULARY Blood sugar diagnostic, drum- type strips  Take 3 strips everyday by miscell. Route for 90 days. Note: Check Sugars Once or Twice a Day       No current facility-administered medications for this visit. Allergies   Allergen Reactions    Bactrim [Sulfamethoxazole-Trimethoprim]     Diclofenac Nausea Only    Zoloft [Sertraline Hcl] Other (See Comments)     Sweating and Intolerance. Subjective:      Review of Systems  AS NOTED IN HPI      Objective:      Physical Exam     OBESE NO DISTRESS  Reviewed Vitals. WEIGHT STABLE  Eyes: LEGALLY BLIND BOTH EYES  NO NYSTAGMUS  ENT : HAS OCCASIONAL NOSE  BLEED NOT BAD. SEEN ENT IN PAST. Neck:  NO MASSES. THYROID NOT ENLARGED. NO ADENOPATHY  Cardiovascular:   HEART REGULAR. NO MURMUR. NO CAROTID BRUIT  Respiratory:  BREATHING COMFORTABLE AT REST. LUNGS  CLEAR . NO WHEEZES  Abdomen:  SOFT. NO TENDERNESS. LIVER AND SPLEEN NOT PALPABLE. NO OTHER MASSES. Extremities:  TRACE EDEMA. BOTH LEGS. NO CALF TENDERNESS. DRY SKIN BOTH SHINS. Peripheral Vascular:  PEDAL PULSES DIFFICULT TO FEEL . FEET WARM. HAS CHRONIC DERMATITIS LEFT LOWER LEG AND PIGMENTATION RT LOWER LEG.- STABLE. Neurological:  ALERT AND ORIENTED X 3 . MONOFILAMENT SENSATIONS DIMINISHED BOTH FEET. REFLEXES NORMAL  MUSCULOSKELETAL :  LOW BACK MOVEMENTS MILDLY PAINFUL. Psychiatric :MOOD AFFECT NORMAL        Assessment:     LAB/IMAGE    POCT  A1C 4/23/19,  7.7%    POCT A1C 11/7/18 ,  5.9%    PREVIOUS LAB:    LAB 2/20/18    A1C  6.2    DID NOT GET LUMBAR SPINE XRAY.  DOES NOT WANT     LAB   3/10/17  A1C 6.6     ALL OTHER LAB DONE  6/21/17     IMPRESSION :     DIABETES TYPE 2  POCT  A1C 4/23/19,  7.7%  NEUROPATHY . HYPERTENSION   HX OF VERTIGO  BETTER NOW. PAST HX OF DEPRESSION . NO MEDS NOW. HX OF LOW BACK PAIN CHRONIC . STABLE  HYPERCHOLESTEROLEMIA   OBESITY   ENLARGED PROSTATE . 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 Type 2 diabetes mellitus without complication, with long-term current use of insulin ([facility] [address]    2. Benign essential hypertension    3. Pure hypercholesterolemia    4. Non morbid obesity, unspecified obesity type    5. Chronic midline low back pain without sciatica          Plan:      1. A1C  DISCUSSED. 2. CONTINUE METFORMIN 1000 MG BID  3. CONTINUE PIOGLITAZONE 30 MG DAILY  4. CONTINUE NATEGLINIDE 120 MG TID WITH MEALS   5. CONTINUE LOVASTATIN . LOPID AND LISINOPRIL, ASPIRIN. 6. CONTINUE GABAPENTIN  100 MG TID  7. CONTINUE TAMSULOSIN  ONE BID  8. CONTINUE ALL MEDS AS REVIEWED AND DISCUSSED. 9. DIET AND EXERCISE AS TOLERATED. 10. CHECK LAB AS ORDERED. 11. RETURN  4   MONTHS.     Orders Placed This Encounter   Procedures    Comprehensive Metabolic Panel     Standing Status:   Future     Standing Expiration Date:   4/23/2020    CBC Auto Differential     Standing Status:   Future     Standing Expiration Date:   4/24/2020    Lipid Panel     Standing Status:   Future     Standing Expiration Date:   4/23/2020     Order Specific Question:   Is Patient Fasting?/# of Hours     Answer:   yes    Microalbumin, Ur     Standing Status:   Future     Standing Expiration Date:   4/23/2020    TSH without Reflex     Standing Status:   Future     Standing Expiration Date:   4/23/2020    Urinalysis     Standing Status:   Future     Standing Expiration Date:   4/24/2020    POCT glycosylated hemoglobin (Hb A1C)     Orders Placed This Encounter   Medications    Continuous Blood Gluc  (FREESTYLE LOUIS 14 DAY READER) JOVANNY     Sig: USE AS DIRECTED1     Dispense:  1 Device     Refill:  0    Continuous Blood Gluc Sensor ([address] [place] [address]) [address] [facility] Sig: USE AS DIRECTED     Dispense:  2 each     Refill:  5        Return in about 4 months (around 8/24/2019) for DIABETES, Follow Up for HTN, HYPERLIPIDEMIA. Visit date not found        Patient given educational materials - see patient instructions. Discussed use, benefit, and side effects of prescribed medications. All patient questions answered. Pt voiced understanding. Reviewed health maintenance. Instructed to continue current medications, diet and exercise. Patient agreed with treatment plan. Follow up as directed.        Electronically signed by Danica Griffin MD on 4/24/2019

## 2019-07-31 RX ORDER — LISINOPRIL AND HYDROCHLOROTHIAZIDE 20; 12.5 MG/1; MG/1
TABLET ORAL
Qty: 90 TABLET | Refills: 1 | Status: SHIPPED | OUTPATIENT
Start: 2019-07-31 | End: 2020-02-06

## 2019-07-31 RX ORDER — GEMFIBROZIL 600 MG/1
TABLET, FILM COATED ORAL
Qty: 180 TABLET | Refills: 1 | Status: SHIPPED | OUTPATIENT
Start: 2019-07-31 | End: 2020-02-06

## 2019-07-31 RX ORDER — LOVASTATIN 20 MG/1
TABLET ORAL
Qty: 90 TABLET | Refills: 1 | Status: SHIPPED | OUTPATIENT
Start: 2019-07-31 | End: 2020-02-06

## 2019-07-31 RX ORDER — PIOGLITAZONEHYDROCHLORIDE 30 MG/1
TABLET ORAL
Qty: 90 TABLET | Refills: 1 | Status: SHIPPED | OUTPATIENT
Start: 2019-07-31 | End: 2020-02-06

## 2019-07-31 RX ORDER — TAMSULOSIN HYDROCHLORIDE 0.4 MG/1
CAPSULE ORAL
Qty: 180 CAPSULE | Refills: 1 | Status: SHIPPED | OUTPATIENT
Start: 2019-07-31 | End: 2020-02-06

## 2019-07-31 RX ORDER — CITALOPRAM 10 MG/1
TABLET ORAL
Qty: 90 TABLET | Refills: 1 | Status: SHIPPED | OUTPATIENT
Start: 2019-07-31 | End: 2020-02-06

## 2019-07-31 NOTE — TELEPHONE ENCOUNTER
Last filled all 3/8/19 90 days with 1 RF  Last seen 4/24/19    Next Visit Date:  Future Appointments   Date Time Provider Willam Foley   10/22/2019 12:45 PM Rian Singletary MD [address] Maintenance   Topic Date Due    Potassium monitoring  1945    Creatinine monitoring  1945    Shingles Vaccine (1 of 2) 04/30/1993    Annual Wellness Visit (AWV)  04/30/2006    Pneumococcal 65+ years Vaccine (2 of 2 - PPSV23) 10/01/2016    DTaP/Tdap/Td vaccine (1 - Tdap) 11/07/2019 (Originally 4/30/1962)    Flu vaccine (1) 11/07/2019 (Originally 9/1/2019)       Hemoglobin A1C (%)   Date Value   04/24/2019 7.7   11/07/2018 5.9             ( goal A1C is < 7)   No results found for: LABMICR  No results found for: LDLCHOLESTEROL, LDLCALC    (goal LDL is <100)   No results found for: AST, ALT, BUN  BP Readings from Last 3 Encounters:   04/24/19 124/60   11/07/18 122/60   03/23/18 (!) 130/52          (goal 120/80)    All Future Testing planned in [facility]  Lab Frequency Next Occurrence   Comprehensive Metabolic Panel Once 31/20/2902   CBC Auto Differential Once 07/24/2019   Lipid Panel Once 07/24/2019   Microalbumin, Ur Once 07/23/2019   TSH without Reflex Once 07/24/2019   Urinalysis Once 05/08/2019               Patient Active Problem List:     Type 2 diabetes mellitus without complication (HCC)     Benign essential hypertension     Pure hypercholesterolemia     Benign prostatic hyperplasia with urinary obstruction     Phimosis     Non morbid obesity     Chronic midline low back pain without sciatica     Morbid obesity with BMI of 40.0-44.9, adult ([address]

## 2019-08-05 RX ORDER — NATEGLINIDE 120 MG/1
TABLET ORAL
Qty: 270 TABLET | Refills: 1 | Status: SHIPPED | OUTPATIENT
Start: 2019-08-05 | End: 2020-02-06

## 2019-09-30 ENCOUNTER — TELEPHONE (OUTPATIENT)
Dept: ENDOCRINOLOGY | Age: 74
End: 2019-09-30

## 2019-12-11 ENCOUNTER — OFFICE VISIT (OUTPATIENT)
Dept: ENDOCRINOLOGY | Age: 74
End: 2019-12-11
Payer: MEDICARE

## 2019-12-11 VITALS
BODY MASS INDEX: 42.57 KG/M2 | DIASTOLIC BLOOD PRESSURE: 70 MMHG | HEIGHT: 69 IN | WEIGHT: 287.4 LBS | SYSTOLIC BLOOD PRESSURE: 136 MMHG | HEART RATE: 64 BPM

## 2019-12-11 DIAGNOSIS — G89.29 CHRONIC MIDLINE LOW BACK PAIN WITHOUT SCIATICA: ICD-10-CM

## 2019-12-11 DIAGNOSIS — Z79.4 TYPE 2 DIABETES MELLITUS WITHOUT COMPLICATION, WITH LONG-TERM CURRENT USE OF INSULIN (HCC): Primary | ICD-10-CM

## 2019-12-11 DIAGNOSIS — N18.2 STAGE 2 CHRONIC KIDNEY DISEASE: ICD-10-CM

## 2019-12-11 DIAGNOSIS — M54.50 CHRONIC MIDLINE LOW BACK PAIN WITHOUT SCIATICA: ICD-10-CM

## 2019-12-11 DIAGNOSIS — H54.7 POOR VISION: ICD-10-CM

## 2019-12-11 DIAGNOSIS — E78.00 PURE HYPERCHOLESTEROLEMIA: ICD-10-CM

## 2019-12-11 DIAGNOSIS — E66.01 MORBID OBESITY WITH BMI OF 40.0-44.9, ADULT (HCC): ICD-10-CM

## 2019-12-11 DIAGNOSIS — I10 BENIGN ESSENTIAL HYPERTENSION: ICD-10-CM

## 2019-12-11 DIAGNOSIS — E11.9 TYPE 2 DIABETES MELLITUS WITHOUT COMPLICATION, WITH LONG-TERM CURRENT USE OF INSULIN (HCC): Primary | ICD-10-CM

## 2019-12-11 LAB — HBA1C MFR BLD: 5.8 %

## 2019-12-11 PROCEDURE — G8484 FLU IMMUNIZE NO ADMIN: HCPCS | Performed by: INTERNAL MEDICINE

## 2019-12-11 PROCEDURE — G8417 CALC BMI ABV UP PARAM F/U: HCPCS | Performed by: INTERNAL MEDICINE

## 2019-12-11 PROCEDURE — 83036 HEMOGLOBIN GLYCOSYLATED A1C: CPT | Performed by: INTERNAL MEDICINE

## 2019-12-11 PROCEDURE — G8427 DOCREV CUR MEDS BY ELIG CLIN: HCPCS | Performed by: INTERNAL MEDICINE

## 2019-12-11 PROCEDURE — 1123F ACP DISCUSS/DSCN MKR DOCD: CPT | Performed by: INTERNAL MEDICINE

## 2019-12-11 PROCEDURE — 4040F PNEUMOC VAC/ADMIN/RCVD: CPT | Performed by: INTERNAL MEDICINE

## 2019-12-11 PROCEDURE — 1036F TOBACCO NON-USER: CPT | Performed by: INTERNAL MEDICINE

## 2019-12-11 PROCEDURE — 99214 OFFICE O/P EST MOD 30 MIN: CPT | Performed by: INTERNAL MEDICINE

## 2019-12-12 RX ORDER — GABAPENTIN 100 MG/1
CAPSULE ORAL
Qty: 270 CAPSULE | Refills: 0 | Status: SHIPPED | OUTPATIENT
Start: 2019-12-12 | End: 2020-02-14

## 2019-12-20 RX ORDER — GABAPENTIN 100 MG/1
100 CAPSULE ORAL 3 TIMES DAILY
Qty: 30 CAPSULE | Refills: 0 | Status: SHIPPED | OUTPATIENT
Start: 2019-12-20 | End: 2020-02-14

## 2020-02-06 RX ORDER — LISINOPRIL AND HYDROCHLOROTHIAZIDE 20; 12.5 MG/1; MG/1
TABLET ORAL
Qty: 90 TABLET | Refills: 1 | Status: SHIPPED | OUTPATIENT
Start: 2020-02-06 | End: 2020-07-22 | Stop reason: SDUPTHER

## 2020-02-06 RX ORDER — GEMFIBROZIL 600 MG/1
TABLET, FILM COATED ORAL
Qty: 180 TABLET | Refills: 1 | Status: SHIPPED | OUTPATIENT
Start: 2020-02-06 | End: 2020-07-22 | Stop reason: SDUPTHER

## 2020-02-06 RX ORDER — PIOGLITAZONEHYDROCHLORIDE 30 MG/1
TABLET ORAL
Qty: 90 TABLET | Refills: 1 | Status: SHIPPED | OUTPATIENT
Start: 2020-02-06 | End: 2020-07-22 | Stop reason: SDUPTHER

## 2020-02-06 RX ORDER — TAMSULOSIN HYDROCHLORIDE 0.4 MG/1
CAPSULE ORAL
Qty: 180 CAPSULE | Refills: 1 | Status: SHIPPED | OUTPATIENT
Start: 2020-02-06 | End: 2020-07-22 | Stop reason: SDUPTHER

## 2020-02-06 RX ORDER — LOVASTATIN 20 MG/1
TABLET ORAL
Qty: 90 TABLET | Refills: 1 | Status: SHIPPED | OUTPATIENT
Start: 2020-02-06 | End: 2020-07-22 | Stop reason: SDUPTHER

## 2020-02-06 RX ORDER — CITALOPRAM 10 MG/1
TABLET ORAL
Qty: 90 TABLET | Refills: 1 | Status: SHIPPED | OUTPATIENT
Start: 2020-02-06 | End: 2020-07-22 | Stop reason: SDUPTHER

## 2020-02-06 RX ORDER — NATEGLINIDE 120 MG/1
TABLET ORAL
Qty: 270 TABLET | Refills: 1 | Status: SHIPPED | OUTPATIENT
Start: 2020-02-06 | End: 2020-07-22 | Stop reason: SDUPTHER

## 2020-02-06 NOTE — TELEPHONE ENCOUNTER
Last visit: 12/11/19  Last Med refill:   Does patient have enough medication for 72 hours:     Next Visit Date:  Future Appointments   Date Time Provider Willam Huffi   4/8/2020 12:00 PM Denny Landau, MD [address] Maintenance   Topic Date Due    Potassium monitoring  1945    Creatinine monitoring  1945    Lipid screen  04/30/1953    DTaP/Tdap/Td vaccine (1 - Tdap) 04/30/1954    Hepatitis B vaccine (1 of 3 - Risk 3-dose series) 04/30/1962    Shingles Vaccine (1 of 2) 04/30/1993    Pneumococcal 65+ years Vaccine (2 of 2 - PPSV23) 10/01/2016    Annual Wellness Visit (AWV)  06/19/2019    Flu vaccine (1) 09/01/2019    Hepatitis A vaccine  Aged Out    Hib vaccine  Aged Out    Meningococcal (ACWY) vaccine  Aged Out       Hemoglobin A1C (%)   Date Value   12/11/2019 5.8   04/24/2019 7.7   11/07/2018 5.9             ( goal A1C is < 7)   No results found for: LABMICR  No results found for: LDLCHOLESTEROL, LDLCALC    (goal LDL is <100)   No results found for: AST, ALT, BUN  BP Readings from Last 3 Encounters:   12/11/19 136/70   04/24/19 124/60   11/07/18 122/60          (goal 120/80)    All Future Testing planned in [facility]  Lab Frequency Next Occurrence   Comprehensive Metabolic Panel Once 62/52/0963   CBC Auto Differential Once 07/24/2019   Lipid Panel Once 07/24/2019   Microalbumin, Ur Once 07/23/2019   TSH without Reflex Once 07/24/2019   Urinalysis Once 14/26/8707   Basic Metabolic Panel Once 40/72/0102   Phosphorus Once 12/11/2019               Patient Active Problem List:     Type 2 diabetes mellitus without complication (HCC)     Benign essential hypertension     Pure hypercholesterolemia     Benign prostatic hyperplasia with urinary obstruction     Phimosis     Non morbid obesity     Chronic midline low back pain without sciatica     Morbid obesity with BMI of 40.0-44.9, adult ([facility] [address]

## 2020-02-14 RX ORDER — GABAPENTIN 100 MG/1
CAPSULE ORAL
Qty: 270 CAPSULE | Refills: 0 | Status: SHIPPED | OUTPATIENT
Start: 2020-02-14 | End: 2020-05-14

## 2020-05-14 RX ORDER — GABAPENTIN 100 MG/1
100 CAPSULE ORAL 3 TIMES DAILY
Qty: 270 CAPSULE | Refills: 0 | Status: SHIPPED | OUTPATIENT
Start: 2020-05-14 | End: 2020-07-24 | Stop reason: SDUPTHER

## 2020-05-19 ENCOUNTER — TELEPHONE (OUTPATIENT)
Dept: ENDOCRINOLOGY | Age: 75
End: 2020-05-19

## 2020-06-15 ENCOUNTER — TELEPHONE (OUTPATIENT)
Dept: ENDOCRINOLOGY | Age: 75
End: 2020-06-15

## 2020-07-22 RX ORDER — LOVASTATIN 20 MG/1
TABLET ORAL
Qty: 90 TABLET | Refills: 0 | Status: SHIPPED | OUTPATIENT
Start: 2020-07-22

## 2020-07-22 RX ORDER — GEMFIBROZIL 600 MG/1
TABLET, FILM COATED ORAL
Qty: 180 TABLET | Refills: 0 | Status: SHIPPED | OUTPATIENT
Start: 2020-07-22

## 2020-07-22 RX ORDER — LISINOPRIL AND HYDROCHLOROTHIAZIDE 20; 12.5 MG/1; MG/1
TABLET ORAL
Qty: 90 TABLET | Refills: 0 | Status: SHIPPED | OUTPATIENT
Start: 2020-07-22

## 2020-07-22 RX ORDER — NATEGLINIDE 120 MG/1
TABLET ORAL
Qty: 270 TABLET | Refills: 0 | Status: SHIPPED | OUTPATIENT
Start: 2020-07-22

## 2020-07-22 RX ORDER — CITALOPRAM 10 MG/1
TABLET ORAL
Qty: 90 TABLET | Refills: 0 | Status: SHIPPED | OUTPATIENT
Start: 2020-07-22

## 2020-07-22 RX ORDER — PIOGLITAZONEHYDROCHLORIDE 30 MG/1
TABLET ORAL
Qty: 90 TABLET | Refills: 0 | Status: SHIPPED | OUTPATIENT
Start: 2020-07-22 | End: 2020-07-24 | Stop reason: SDUPTHER

## 2020-07-22 RX ORDER — TAMSULOSIN HYDROCHLORIDE 0.4 MG/1
CAPSULE ORAL
Qty: 180 CAPSULE | Refills: 0 | Status: SHIPPED | OUTPATIENT
Start: 2020-07-22

## 2020-07-22 NOTE — TELEPHONE ENCOUNTER
Last filled 2/6/2020 90 days with 0 RF  Last seen 12/2019  Pt was transferred to [facility] to assist with establishing care with PCP. Next Visit Date:  No future appointments.     Health Maintenance   Topic Date Due    Potassium monitoring  1945    Creatinine monitoring  1945    Lipid screen  04/30/1953    DTaP/Tdap/Td vaccine (1 - Tdap) 04/30/1962    Shingles Vaccine (1 of 2) 04/30/1993    Pneumococcal 65+ years Vaccine (2 of 2 - PPSV23) 10/01/2016    Annual Wellness Visit (AWV)  06/19/2019    Flu vaccine (1) 09/01/2020    Hepatitis A vaccine  Aged Out    Hib vaccine  Aged Out    Meningococcal (ACWY) vaccine  Aged Out       Hemoglobin A1C (%)   Date Value   12/11/2019 5.8   04/24/2019 7.7   11/07/2018 5.9             ( goal A1C is < 7)   No results found for: LABMICR  No results found for: LDLCHOLESTEROL, LDLCALC    (goal LDL is <100)   No results found for: AST, ALT, BUN  BP Readings from Last 3 Encounters:   12/11/19 136/70   04/24/19 124/60   11/07/18 122/60          (goal 120/80)    All Future Testing planned in [facility]  Lab Frequency Next Occurrence   Basic Metabolic Panel Once 62/67/8366   Phosphorus Once 12/11/2019               Patient Active Problem List:     Type 2 diabetes mellitus without complication (HCC)     Benign essential hypertension     Pure hypercholesterolemia     Benign prostatic hyperplasia with urinary obstruction     Phimosis     Non morbid obesity     Chronic midline low back pain without sciatica     Morbid obesity with BMI of 40.0-44.9, adult ([address]

## 2020-07-23 NOTE — TELEPHONE ENCOUNTER
Patient requests medication refills sent to [facility]. Patient states he still has a couple of weeks of medication left, but will need a refill before his apt with a new provider. Patient states all his other medication were refill, except for these two. He states he will need a 90 day supply, so he does not have a high co-pay. Patient requests a call back with an update at [phone].      Last OV: 12/11/19

## 2020-07-24 RX ORDER — PIOGLITAZONEHYDROCHLORIDE 30 MG/1
TABLET ORAL
Qty: 90 TABLET | Refills: 0 | Status: SHIPPED | OUTPATIENT
Start: 2020-07-24

## 2020-07-24 RX ORDER — GABAPENTIN 100 MG/1
100 CAPSULE ORAL 3 TIMES DAILY
Qty: 270 CAPSULE | Refills: 0 | Status: SHIPPED | OUTPATIENT
Start: 2020-07-24 | End: 2020-10-22